# Patient Record
Sex: FEMALE | Race: BLACK OR AFRICAN AMERICAN | URBAN - METROPOLITAN AREA
[De-identification: names, ages, dates, MRNs, and addresses within clinical notes are randomized per-mention and may not be internally consistent; named-entity substitution may affect disease eponyms.]

---

## 2018-07-25 ENCOUNTER — APPOINTMENT (RX ONLY)
Dept: URBAN - METROPOLITAN AREA CLINIC 142 | Facility: CLINIC | Age: 38
Setting detail: DERMATOLOGY
End: 2018-07-25

## 2018-07-25 DIAGNOSIS — L65.8 OTHER SPECIFIED NONSCARRING HAIR LOSS: ICD-10-CM

## 2018-07-25 DIAGNOSIS — L81.1 CHLOASMA: ICD-10-CM

## 2018-07-25 PROCEDURE — ? RECOMMENDATIONS

## 2018-07-25 PROCEDURE — 99203 OFFICE O/P NEW LOW 30 MIN: CPT

## 2018-07-25 PROCEDURE — ? PRESCRIPTION

## 2018-07-25 PROCEDURE — ? PRODUCT LINE (SUNSCREENS)

## 2018-07-25 PROCEDURE — ? COUNSELING

## 2018-07-25 RX ORDER — PHARMACY COMPOUNDING ACCESSORY
EACH MISCELLANEOUS
Qty: 1 | Refills: 3 | Status: ERX | COMMUNITY
Start: 2018-07-25

## 2018-07-25 RX ADMIN — Medication: at 16:24

## 2018-07-25 ASSESSMENT — LOCATION DETAILED DESCRIPTION DERM
LOCATION DETAILED: LEFT CENTRAL FRONTAL SCALP
LOCATION DETAILED: LEFT FOREHEAD

## 2018-07-25 ASSESSMENT — LOCATION ZONE DERM
LOCATION ZONE: SCALP
LOCATION ZONE: FACE

## 2018-07-25 ASSESSMENT — LOCATION SIMPLE DESCRIPTION DERM
LOCATION SIMPLE: LEFT SCALP
LOCATION SIMPLE: LEFT FOREHEAD

## 2018-07-25 NOTE — PROCEDURE: PRODUCT LINE (SUNSCREENS)
Name Of Product 3: Rozatrol
Send Charges To Patient Encounter: No
Name Of Product 4: Elta daily
Allow Plan To Count Towards E/M Coding: Yes
Product 4 Price (In Dollars - Numeric Only, No Special Characters Or $): 0.00
Name Of Product 2: Elta MD Clear tinted
Product 2 Units: 0
Detail Level: Zone
Name Of Product 1: Elta MD Lotion
Product 2 Price (In Dollars - Numeric Only, No Special Characters Or $): 34
Product 3 Application Directions: Apply in the morning before ZO glycogent. Then do Elta Clear spf
Product 4 Units: 1
Name Of Product 5: Melamin